# Patient Record
Sex: MALE | Race: WHITE | NOT HISPANIC OR LATINO | Employment: UNEMPLOYED | URBAN - METROPOLITAN AREA
[De-identification: names, ages, dates, MRNs, and addresses within clinical notes are randomized per-mention and may not be internally consistent; named-entity substitution may affect disease eponyms.]

---

## 2017-06-24 ENCOUNTER — APPOINTMENT (OUTPATIENT)
Dept: RADIOLOGY | Facility: MEDICAL CENTER | Age: 57
End: 2017-06-24
Attending: INTERNAL MEDICINE
Payer: MEDICARE

## 2017-06-24 ENCOUNTER — APPOINTMENT (OUTPATIENT)
Dept: RADIOLOGY | Facility: MEDICAL CENTER | Age: 57
End: 2017-06-24
Attending: EMERGENCY MEDICINE
Payer: MEDICARE

## 2017-06-24 ENCOUNTER — RESOLUTE PROFESSIONAL BILLING HOSPITAL PROF FEE (OUTPATIENT)
Dept: HOSPITALIST | Facility: MEDICAL CENTER | Age: 57
End: 2017-06-24
Payer: MEDICARE

## 2017-06-24 ENCOUNTER — HOSPITAL ENCOUNTER (OUTPATIENT)
Facility: MEDICAL CENTER | Age: 57
End: 2017-06-25
Attending: EMERGENCY MEDICINE | Admitting: INTERNAL MEDICINE
Payer: MEDICARE

## 2017-06-24 DIAGNOSIS — R10.13 EPIGASTRIC PAIN: ICD-10-CM

## 2017-06-24 DIAGNOSIS — R07.9 CHEST PAIN, UNSPECIFIED TYPE: ICD-10-CM

## 2017-06-24 PROBLEM — R10.9 ABDOMINAL PAIN: Status: ACTIVE | Noted: 2017-06-24

## 2017-06-24 LAB
ALBUMIN SERPL BCP-MCNC: 4.1 G/DL (ref 3.2–4.9)
ALBUMIN/GLOB SERPL: 1.7 G/DL
ALP SERPL-CCNC: 30 U/L (ref 30–99)
ALT SERPL-CCNC: 38 U/L (ref 2–50)
ANION GAP SERPL CALC-SCNC: 11 MMOL/L (ref 0–11.9)
ANISOCYTOSIS BLD QL SMEAR: ABNORMAL
APPEARANCE UR: CLEAR
APTT PPP: 33.9 SEC (ref 24.7–36)
AST SERPL-CCNC: 24 U/L (ref 12–45)
BACTERIA #/AREA URNS HPF: ABNORMAL /HPF
BASOPHILS # BLD AUTO: 0.9 % (ref 0–1.8)
BASOPHILS # BLD: 0.04 K/UL (ref 0–0.12)
BILIRUB SERPL-MCNC: 1 MG/DL (ref 0.1–1.5)
BILIRUB UR QL STRIP.AUTO: NEGATIVE
BNP SERPL-MCNC: 32 PG/ML (ref 0–100)
BUN SERPL-MCNC: 28 MG/DL (ref 8–22)
CALCIUM SERPL-MCNC: 8.6 MG/DL (ref 8.5–10.5)
CHLORIDE SERPL-SCNC: 104 MMOL/L (ref 96–112)
CO2 SERPL-SCNC: 21 MMOL/L (ref 20–33)
COLOR UR: YELLOW
CREAT SERPL-MCNC: 5.31 MG/DL (ref 0.5–1.4)
EOSINOPHIL # BLD AUTO: 0.25 K/UL (ref 0–0.51)
EOSINOPHIL NFR BLD: 5.3 % (ref 0–6.9)
ERYTHROCYTE [DISTWIDTH] IN BLOOD BY AUTOMATED COUNT: 66.4 FL (ref 35.9–50)
GFR SERPL CREATININE-BSD FRML MDRD: 11 ML/MIN/1.73 M 2
GLOBULIN SER CALC-MCNC: 2.4 G/DL (ref 1.9–3.5)
GLUCOSE SERPL-MCNC: 106 MG/DL (ref 65–99)
GLUCOSE UR STRIP.AUTO-MCNC: NEGATIVE MG/DL
HAV IGM SERPL QL IA: NEGATIVE
HBV CORE IGM SER QL: NEGATIVE
HBV SURFACE AB SERPL IA-ACNC: <3.1 MIU/ML (ref 0–10)
HBV SURFACE AG SER QL: NEGATIVE
HCT VFR BLD AUTO: 30.6 % (ref 42–52)
HCV AB SER QL: NEGATIVE
HGB BLD-MCNC: 10.6 G/DL (ref 14–18)
INR PPP: 0.98 (ref 0.87–1.13)
KETONES UR STRIP.AUTO-MCNC: NEGATIVE MG/DL
LACTATE BLD-SCNC: 0.8 MMOL/L (ref 0.5–2)
LACTATE BLD-SCNC: 0.8 MMOL/L (ref 0.5–2)
LEUKOCYTE ESTERASE UR QL STRIP.AUTO: NEGATIVE
LIPASE SERPL-CCNC: 20 U/L (ref 11–82)
LYMPHOCYTES # BLD AUTO: 0.67 K/UL (ref 1–4.8)
LYMPHOCYTES NFR BLD: 14 % (ref 22–41)
MACROCYTES BLD QL SMEAR: ABNORMAL
MANUAL DIFF BLD: NORMAL
MCH RBC QN AUTO: 38.5 PG (ref 27–33)
MCHC RBC AUTO-ENTMCNC: 34.6 G/DL (ref 33.7–35.3)
MCV RBC AUTO: 111.3 FL (ref 81.4–97.8)
MICRO URNS: ABNORMAL
MONOCYTES # BLD AUTO: 0.8 K/UL (ref 0–0.85)
MONOCYTES NFR BLD AUTO: 16.7 % (ref 0–13.4)
MORPHOLOGY BLD-IMP: NORMAL
NEUTROPHILS # BLD AUTO: 3.03 K/UL (ref 1.82–7.42)
NEUTROPHILS NFR BLD: 63.1 % (ref 44–72)
NITRITE UR QL STRIP.AUTO: NEGATIVE
NRBC # BLD AUTO: 0 K/UL
NRBC BLD AUTO-RTO: 0 /100 WBC
PH UR STRIP.AUTO: 8 [PH]
PLATELET # BLD AUTO: 55 K/UL (ref 164–446)
PLATELET BLD QL SMEAR: NORMAL
PMV BLD AUTO: 11.7 FL (ref 9–12.9)
POTASSIUM SERPL-SCNC: 4.1 MMOL/L (ref 3.6–5.5)
PROT SERPL-MCNC: 6.5 G/DL (ref 6–8.2)
PROT UR QL STRIP: 100 MG/DL
PROTHROMBIN TIME: 13.3 SEC (ref 12–14.6)
RBC # BLD AUTO: 2.75 M/UL (ref 4.7–6.1)
RBC # URNS HPF: ABNORMAL /HPF
RBC BLD AUTO: PRESENT
RBC UR QL AUTO: ABNORMAL
SODIUM SERPL-SCNC: 136 MMOL/L (ref 135–145)
SP GR UR STRIP.AUTO: 1.01
TROPONIN I SERPL-MCNC: 0.04 NG/ML (ref 0–0.04)
WBC # BLD AUTO: 4.8 K/UL (ref 4.8–10.8)
WBC #/AREA URNS HPF: ABNORMAL /HPF

## 2017-06-24 PROCEDURE — 84484 ASSAY OF TROPONIN QUANT: CPT

## 2017-06-24 PROCEDURE — 85027 COMPLETE CBC AUTOMATED: CPT

## 2017-06-24 PROCEDURE — A9270 NON-COVERED ITEM OR SERVICE: HCPCS | Performed by: INTERNAL MEDICINE

## 2017-06-24 PROCEDURE — 700102 HCHG RX REV CODE 250 W/ 637 OVERRIDE(OP): Performed by: NURSE PRACTITIONER

## 2017-06-24 PROCEDURE — 700111 HCHG RX REV CODE 636 W/ 250 OVERRIDE (IP): Performed by: INTERNAL MEDICINE

## 2017-06-24 PROCEDURE — A9270 NON-COVERED ITEM OR SERVICE: HCPCS | Performed by: PHARMACIST

## 2017-06-24 PROCEDURE — 96361 HYDRATE IV INFUSION ADD-ON: CPT

## 2017-06-24 PROCEDURE — 80053 COMPREHEN METABOLIC PANEL: CPT

## 2017-06-24 PROCEDURE — 90935 HEMODIALYSIS ONE EVALUATION: CPT

## 2017-06-24 PROCEDURE — G0378 HOSPITAL OBSERVATION PER HR: HCPCS

## 2017-06-24 PROCEDURE — 96376 TX/PRO/DX INJ SAME DRUG ADON: CPT | Mod: XU

## 2017-06-24 PROCEDURE — 81001 URINALYSIS AUTO W/SCOPE: CPT

## 2017-06-24 PROCEDURE — 83690 ASSAY OF LIPASE: CPT

## 2017-06-24 PROCEDURE — A9270 NON-COVERED ITEM OR SERVICE: HCPCS | Performed by: EMERGENCY MEDICINE

## 2017-06-24 PROCEDURE — 76705 ECHO EXAM OF ABDOMEN: CPT

## 2017-06-24 PROCEDURE — 96372 THER/PROPH/DIAG INJ SC/IM: CPT | Mod: XU

## 2017-06-24 PROCEDURE — 700105 HCHG RX REV CODE 258: Performed by: INTERNAL MEDICINE

## 2017-06-24 PROCEDURE — 85730 THROMBOPLASTIN TIME PARTIAL: CPT

## 2017-06-24 PROCEDURE — 83880 ASSAY OF NATRIURETIC PEPTIDE: CPT

## 2017-06-24 PROCEDURE — 93005 ELECTROCARDIOGRAM TRACING: CPT | Performed by: EMERGENCY MEDICINE

## 2017-06-24 PROCEDURE — 85610 PROTHROMBIN TIME: CPT

## 2017-06-24 PROCEDURE — 36415 COLL VENOUS BLD VENIPUNCTURE: CPT

## 2017-06-24 PROCEDURE — 99220 PR INITIAL OBSERVATION CARE,LEVL III: CPT | Performed by: INTERNAL MEDICINE

## 2017-06-24 PROCEDURE — 700102 HCHG RX REV CODE 250 W/ 637 OVERRIDE(OP): Performed by: EMERGENCY MEDICINE

## 2017-06-24 PROCEDURE — 87086 URINE CULTURE/COLONY COUNT: CPT

## 2017-06-24 PROCEDURE — 700111 HCHG RX REV CODE 636 W/ 250 OVERRIDE (IP): Performed by: HOSPITALIST

## 2017-06-24 PROCEDURE — 85007 BL SMEAR W/DIFF WBC COUNT: CPT

## 2017-06-24 PROCEDURE — 86706 HEP B SURFACE ANTIBODY: CPT

## 2017-06-24 PROCEDURE — A9270 NON-COVERED ITEM OR SERVICE: HCPCS | Performed by: NURSE PRACTITIONER

## 2017-06-24 PROCEDURE — 700102 HCHG RX REV CODE 250 W/ 637 OVERRIDE(OP): Performed by: PHARMACIST

## 2017-06-24 PROCEDURE — 96374 THER/PROPH/DIAG INJ IV PUSH: CPT | Mod: XU

## 2017-06-24 PROCEDURE — 87040 BLOOD CULTURE FOR BACTERIA: CPT

## 2017-06-24 PROCEDURE — 99285 EMERGENCY DEPT VISIT HI MDM: CPT

## 2017-06-24 PROCEDURE — 83605 ASSAY OF LACTIC ACID: CPT | Mod: 91

## 2017-06-24 PROCEDURE — 80074 ACUTE HEPATITIS PANEL: CPT

## 2017-06-24 PROCEDURE — 700102 HCHG RX REV CODE 250 W/ 637 OVERRIDE(OP): Performed by: INTERNAL MEDICINE

## 2017-06-24 PROCEDURE — 71010 DX-CHEST-PORTABLE (1 VIEW): CPT

## 2017-06-24 RX ORDER — ACYCLOVIR 200 MG/1
200 CAPSULE ORAL 2 TIMES DAILY
Status: DISCONTINUED | OUTPATIENT
Start: 2017-06-24 | End: 2017-06-25 | Stop reason: HOSPADM

## 2017-06-24 RX ORDER — OMEPRAZOLE 20 MG/1
20 CAPSULE, DELAYED RELEASE ORAL 2 TIMES DAILY
Status: DISCONTINUED | OUTPATIENT
Start: 2017-06-24 | End: 2017-06-25 | Stop reason: HOSPADM

## 2017-06-24 RX ORDER — PROMETHAZINE HYDROCHLORIDE 25 MG/1
12.5-25 TABLET ORAL EVERY 4 HOURS PRN
Status: DISCONTINUED | OUTPATIENT
Start: 2017-06-24 | End: 2017-06-25 | Stop reason: HOSPADM

## 2017-06-24 RX ORDER — ACETAMINOPHEN 325 MG/1
650 TABLET ORAL EVERY 6 HOURS PRN
Status: DISCONTINUED | OUTPATIENT
Start: 2017-06-24 | End: 2017-06-25 | Stop reason: HOSPADM

## 2017-06-24 RX ORDER — FOLIC ACID/VIT B COMPLEX AND C 0.8 MG
1 TABLET ORAL EVERY EVENING
COMMUNITY

## 2017-06-24 RX ORDER — PREDNISONE 2.5 MG/1
7.5 TABLET ORAL DAILY
COMMUNITY

## 2017-06-24 RX ORDER — LORAZEPAM 0.5 MG/1
0.25 TABLET ORAL EVERY 6 HOURS PRN
COMMUNITY

## 2017-06-24 RX ORDER — MORPHINE SULFATE 4 MG/ML
1-2 INJECTION, SOLUTION INTRAMUSCULAR; INTRAVENOUS EVERY 6 HOURS PRN
Status: DISCONTINUED | OUTPATIENT
Start: 2017-06-24 | End: 2017-06-25

## 2017-06-24 RX ORDER — AMOXICILLIN 250 MG
2 CAPSULE ORAL 2 TIMES DAILY
Status: DISCONTINUED | OUTPATIENT
Start: 2017-06-24 | End: 2017-06-25 | Stop reason: HOSPADM

## 2017-06-24 RX ORDER — DEXAMETHASONE 4 MG/1
40 TABLET ORAL SEE ADMIN INSTRUCTIONS
Status: DISCONTINUED | OUTPATIENT
Start: 2017-06-24 | End: 2017-06-24

## 2017-06-24 RX ORDER — ONDANSETRON 4 MG/1
4 TABLET, ORALLY DISINTEGRATING ORAL EVERY 4 HOURS PRN
Status: DISCONTINUED | OUTPATIENT
Start: 2017-06-24 | End: 2017-06-25 | Stop reason: HOSPADM

## 2017-06-24 RX ORDER — LENALIDOMIDE 5 MG/1
5 CAPSULE ORAL SEE ADMIN INSTRUCTIONS
COMMUNITY

## 2017-06-24 RX ORDER — ASPIRIN 325 MG
325 TABLET ORAL ONCE
Status: COMPLETED | OUTPATIENT
Start: 2017-06-24 | End: 2017-06-24

## 2017-06-24 RX ORDER — LENALIDOMIDE 5 MG/1
5 CAPSULE ORAL SEE ADMIN INSTRUCTIONS
Status: DISCONTINUED | OUTPATIENT
Start: 2017-06-24 | End: 2017-06-24

## 2017-06-24 RX ORDER — FENTANYL 25 UG/1
1 PATCH TRANSDERMAL
Status: DISCONTINUED | OUTPATIENT
Start: 2017-06-24 | End: 2017-06-25 | Stop reason: HOSPADM

## 2017-06-24 RX ORDER — HEPARIN SODIUM 5000 [USP'U]/ML
5000 INJECTION, SOLUTION INTRAVENOUS; SUBCUTANEOUS EVERY 8 HOURS
Status: DISCONTINUED | OUTPATIENT
Start: 2017-06-24 | End: 2017-06-25 | Stop reason: HOSPADM

## 2017-06-24 RX ORDER — LORAZEPAM 1 MG/1
0.25 TABLET ORAL EVERY 6 HOURS PRN
Status: DISCONTINUED | OUTPATIENT
Start: 2017-06-24 | End: 2017-06-25 | Stop reason: HOSPADM

## 2017-06-24 RX ORDER — POLYETHYLENE GLYCOL 3350 17 G/17G
1 POWDER, FOR SOLUTION ORAL
Status: DISCONTINUED | OUTPATIENT
Start: 2017-06-24 | End: 2017-06-25 | Stop reason: HOSPADM

## 2017-06-24 RX ORDER — FENTANYL 25 UG/1
1 PATCH TRANSDERMAL
COMMUNITY

## 2017-06-24 RX ORDER — SODIUM CHLORIDE 9 MG/ML
INJECTION, SOLUTION INTRAVENOUS CONTINUOUS
Status: DISCONTINUED | OUTPATIENT
Start: 2017-06-24 | End: 2017-06-25 | Stop reason: HOSPADM

## 2017-06-24 RX ORDER — PROMETHAZINE HYDROCHLORIDE 25 MG/1
12.5-25 SUPPOSITORY RECTAL EVERY 4 HOURS PRN
Status: DISCONTINUED | OUTPATIENT
Start: 2017-06-24 | End: 2017-06-25 | Stop reason: HOSPADM

## 2017-06-24 RX ORDER — BISACODYL 10 MG
10 SUPPOSITORY, RECTAL RECTAL
Status: DISCONTINUED | OUTPATIENT
Start: 2017-06-24 | End: 2017-06-25 | Stop reason: HOSPADM

## 2017-06-24 RX ORDER — ACYCLOVIR 200 MG/1
200 CAPSULE ORAL 2 TIMES DAILY
COMMUNITY

## 2017-06-24 RX ORDER — PANTOPRAZOLE SODIUM 40 MG/1
40 TABLET, DELAYED RELEASE ORAL 2 TIMES DAILY
COMMUNITY

## 2017-06-24 RX ORDER — ONDANSETRON 2 MG/ML
4 INJECTION INTRAMUSCULAR; INTRAVENOUS EVERY 4 HOURS PRN
Status: DISCONTINUED | OUTPATIENT
Start: 2017-06-24 | End: 2017-06-25 | Stop reason: HOSPADM

## 2017-06-24 RX ORDER — DEXAMETHASONE 4 MG/1
40 TABLET ORAL SEE ADMIN INSTRUCTIONS
COMMUNITY

## 2017-06-24 RX ORDER — PANTOPRAZOLE SODIUM 40 MG/1
40 TABLET, DELAYED RELEASE ORAL 2 TIMES DAILY
Status: DISCONTINUED | OUTPATIENT
Start: 2017-06-24 | End: 2017-06-24

## 2017-06-24 RX ADMIN — LORAZEPAM 0.25 MG: 1 TABLET ORAL at 00:05

## 2017-06-24 RX ADMIN — ASPIRIN 81 MG: 81 TABLET ORAL at 15:38

## 2017-06-24 RX ADMIN — LORAZEPAM 0.25 MG: 1 TABLET ORAL at 15:50

## 2017-06-24 RX ADMIN — HEPARIN SODIUM 5000 UNITS: 5000 INJECTION, SOLUTION INTRAVENOUS; SUBCUTANEOUS at 15:38

## 2017-06-24 RX ADMIN — OMEPRAZOLE 20 MG: 20 CAPSULE, DELAYED RELEASE ORAL at 15:38

## 2017-06-24 RX ADMIN — ASPIRIN 325 MG: 325 TABLET, COATED ORAL at 10:06

## 2017-06-24 RX ADMIN — SODIUM CHLORIDE: 9 INJECTION, SOLUTION INTRAVENOUS at 15:41

## 2017-06-24 RX ADMIN — CALCIUM CARBONATE-CHOLECALCIFEROL TAB 250 MG-125 UNIT 2 TABLET: 250-125 TAB at 22:50

## 2017-06-24 RX ADMIN — HEPARIN SODIUM 5000 UNITS: 5000 INJECTION, SOLUTION INTRAVENOUS; SUBCUTANEOUS at 22:10

## 2017-06-24 RX ADMIN — MORPHINE SULFATE 2 MG: 4 INJECTION INTRAVENOUS at 16:06

## 2017-06-24 RX ADMIN — MORPHINE SULFATE 2 MG: 4 INJECTION INTRAVENOUS at 22:10

## 2017-06-24 RX ADMIN — OMEPRAZOLE 20 MG: 20 CAPSULE, DELAYED RELEASE ORAL at 22:09

## 2017-06-24 RX ADMIN — ACETAMINOPHEN 650 MG: 325 TABLET, FILM COATED ORAL at 22:49

## 2017-06-24 RX ADMIN — ACYCLOVIR 200 MG: 200 CAPSULE ORAL at 22:09

## 2017-06-24 ASSESSMENT — PAIN SCALES - GENERAL
PAINLEVEL_OUTOF10: 5
PAINLEVEL_OUTOF10: 5

## 2017-06-24 ASSESSMENT — LIFESTYLE VARIABLES
EVER_SMOKED: NEVER
ALCOHOL_USE: NO
DO YOU DRINK ALCOHOL: NO

## 2017-06-24 NOTE — DISCHARGE PLANNING
Care Transition Team Assessment    Information Source  Orientation : Oriented x 4  Information Given By: Patient  Who is responsible for making decisions for patient? : Patient         Elopement Risk  Legal Hold: No  Ambulatory or Self Mobile in Wheelchair: No-Not an Elopement Risk    Interdisciplinary Discharge Planning  Able to Return to Previous ADL's: Yes  Mobility Issues: No  Durable Medical Equipment: Not Applicable              Finances  Financial Barriers to Discharge: No  Prescription Coverage: Yes                                  Anticipated Discharge Information  Anticipated discharge disposition: Home

## 2017-06-24 NOTE — ED NOTES
Protocol initiated, port accessed aseptic technique, sent blood to lab.  Called  for 2nd blood culture

## 2017-06-24 NOTE — H&P
PRIMARY CARE PHYSICIAN:  None stated.    CHIEF COMPLAINT:  Abdominal pain.    HISTORY OF PRESENT ILLNESS:  Patient is a 56-year-old male from [place]   here in town for thang came to the ER with above.  Per patient, he has been   having epigastric pain and also right upper quadrant pain for the past 2 days.   He described the pain as bloating pain 5-6/10 in intensity, no radiation.    Because of the pain, he has not been able to eat or drink much.  Patient also   has history of end-stage renal disease on hemodialysis Tuesday, Thursday, and   Saturday.  Nephrology has been consulted.  Also, the patient has history of   multiple myeloma.  Apart from that, patient denied any fever, chills, nausea,   vomiting or any neurological deficit.    REVIEW OF SYSTEMS:  All other systems were reviewed and negative.  The rest by   HPI.    ALLERGY HISTORY:  No known drug allergy.    PAST MEDICAL HISTORY:  1.  End-stage renal disease, on hemodialysis.  2.  Multiple myeloma.    PAST SURGICAL HISTORY:  AV fistula creation.    SOCIAL HISTORY:  Denies smoking, drinks alcohol 1 or 2 shots of alcohol a day.    Denied illicit drug abuse.    FAMILY HISTORY:  No pertinent family history.    OUTPATIENT MEDICATIONS:  Ativan 0.25 mg q. 8 hours p.r.n., aspirin 81 mg   daily, pantoprazole 40 mg b.i.d., acyclovir 200 mg b.i.d., prednisone 7.5 mg   daily, lenalidomide 5 mg, dexamethasone 40 mg with chemotherapy, fentanyl   patch.    PHYSICAL EXAMINATION:  VITAL SIGNS:  Temperature 98.4, pulse rate 68, respiratory rate 15, blood   pressure 118/75, satting 93% on room air.  GENERAL:  Alert, communicative.  HEENT:  Normocephalic, atraumatic.  NECK:  Supple.  No neck gland enlargement.  CARDIOVASCULAR:  Normal first and second sound.  No murmur.  RESPIRATORY:  Normal respiratory effort.  No wheezing.  ABDOMEN:  Soft, bowel sounds present.  Tenderness on palpation in the right   upper quadrant and epigastric area noticed.  No guarding, no  rebound.  CENTRAL NERVOUS SYSTEM:  Cranial nerve II through XII intact.  No neurological   deficit.  EXTREMITIES:  No edema, clubbing.  PSYCHIATRIC:  Normal affect and mood.  Alert and oriented x4.  SKIN:  Warm and moist.    LABORATORY DATA:  WBC 4.8, hemoglobin 10.6, platelets 55.  Sodium 137,   potassium 4.1, BUN 28, creatinine 5.31.    ASSESSMENT AND PLAN:  1.  Upper quadrant pain and epigastric pain pathology: ? from   cholecystitis.  Patient's lipase is normal.  I ordered ultrasound of abdomen   to rule out any gallbladder pathology.  There is no liver enzyme or bilirubin   elevation on chem. panel.  Also, another possibility is underlying    duodenal ulcer since the patient is on steroids for his multiple myeloma.  Continue pantoprazole.  2.  History of multiple myeloma.  Patient is taking steroids including   dexamethasone with chemotherapy and prednisone and lenalidomide.  3.  End-stage renal disease, on hemodialysis.  Nephrology has been consulted.  4.  Deep venous thrombosis prophylaxis, heparin.  5.  Patient is a FULL CODE.       ____________________________________     MD YURI REYNOLDS / DERICK    DD:  06/24/2017 12:29:04  DT:  06/24/2017 16:28:55    D#:  [id number]  Job#:  [id number]

## 2017-06-24 NOTE — ED NOTES
The Medication Reconciliation process has been completed by interviewing the patient who has his meds with him.      Allergies have been reviewed  Antibiotic use in 30 days - none    Home Pharmacy:  [facility] in [place], [place] and "[facility]" ([place]) for his Revlimid

## 2017-06-24 NOTE — PROGRESS NOTES
Received pt from ED on arrival pt a&ox4,protective isolation cont'd,pt with AV fistula on Lt arm with good bruit and thrill,right chest port accessed,with ivf running,pt still c/o epigastric pain,pt for dialysis,poc explained.

## 2017-06-24 NOTE — ED NOTES
Pt ambulated to green 23,changed to gown and placed monitor.   Pt from [place] here for the [place], he is also a dialysis patient. He is due to be dialize today.   Ordered protective isolation.

## 2017-06-24 NOTE — IP AVS SNAPSHOT
" Home Care Instructions                                                                                                                  Name:Alfredo Thomas  Medical Record Number:[id number]  CSN: [id number]    YOB: 1960   Age: 56 y.o.  Sex: male  HT:1.727 m (5' 8\") WT: 76.4 kg (168 lb 6.9 oz)          Admit Date: 6/24/2017     Discharge Date:   Today's Date: 6/25/2017  Attending Doctor:  Jony Burks M.D.                  Allergies:  Review of patient's allergies indicates no known allergies.            Discharge Instructions       Discharge Instructions    Discharged to home by car with friend. Discharged via wheelchair, hospital escort: Yes.  Special equipment needed: Not Applicable    Be sure to schedule a follow-up appointment with your primary care doctor or any specialists as instructed.     Discharge Plan:   Diet Plan: Discussed  Activity Level: Discussed  Confirmed Follow up Appointment: Patient to Call and Schedule Appointment  Confirmed Symptoms Management: Discussed  Medication Reconciliation Updated: Yes  Influenza Vaccine Indication: Not indicated: Previously immunized this influenza season and > 8 years of age    I understand that a diet low in cholesterol, fat, and sodium is recommended for good health. Unless I have been given specific instructions below for another diet, I accept this instruction as my diet prescription.   Other diet:     Special Instructions:   RX eprescribed to [id number] - [facility] [place] - [address] AT [facility]   Activity: As tolerated.   Diet: renal   Followup:   -PCP 1 week   -Oncology, call Monday for appointment to follow up on CT scan results   -GI referral by PCP or oncology for colonoscopy   -Your nephrologist Tuesday, for HD   Instructions:   -Given instructions to return to the ER if any new or worsening symptoms, worsening condition, or failure to improve   -Call PCP for followup   -No smoking, no alcohol, no caffeine   "   -Encourage risk factor reduction with tobacco and alcohol abstinence, diet changes,     · Is patient discharged on Warfarin / Coumadin?   No     · Is patient Post Blood Transfusion?  No    Clostridium Difficile Infection  Clostridium difficile (C. difficile or C. diff) is a bacterium normally found in the intestinal tract or colon. C. difficile infection causes diarrhea and sometimes a severe disease called pseudomembranous colitis (C. difficile colitis). C. difficile colitis can damage the lining of the colon or cause the colon to become very large (toxic megacolon). Older adults and people with certain medical conditions have a greater risk of getting C. difficile infections.  CAUSES  The balance of bacteria in your colon can change when you are sick, especially when taking antibiotic medicine. Taking antibiotics may allow the C. difficile to grow, multiply, and make a toxin that causes C. difficile infection.   SYMPTOMS  · Diarrhea.  · Fever.  · Fatigue.  · Loss of appetite.  · Nausea.  · Abdominal swelling, pain, or tenderness.  · Dehydration.  DIAGNOSIS  Your health care provider may suspect C. difficile infection based on your symptoms and if you have taken antibiotics recently. Your health care provider may also order:  · A lab test that can detect the toxin in your stool.  · A sigmoidoscopy or colonoscopy to look at the appearance of your colon. These procedures involve passing an instrument through your rectum to look at the inside of your colon.  Your health care provider will help determine if these tests are necessary.  TREATMENT  Treatment may include:  · Taking antibiotics that keep C. difficile from growing.  · Stopping the antibiotics you were on before the C. difficile infection began. Only do this if instructed to do so by your health care provider.  · IV fluids and correction of electrolyte imbalance.  · Surgery to remove the infected part of the intestines. This is rare.  HOME CARE  INSTRUCTIONS  · Drink enough fluids to keep your urine clear or pale yellow. Avoid milk, caffeine, and alcohol.  · Ask your health care provider for specific rehydration instructions.  · Eat small, frequent meals rather than large meals.  · Take your antibiotics as directed. Finish them even if you start to feel better.  · Do not use medicines to slow diarrhea. This could delay healing or cause problems.  · Wash your hands thoroughly after using the bathroom and before preparing food. Make sure people who live with you wash their hands often, too.  · Clean all surfaces with a product that contains chlorine bleach.  SEEK MEDICAL CARE IF:  · Your diarrhea lasts longer than expected or comes back after you finish your antibiotic medicine for the C. difficile infection.  · You have trouble staying hydrated.  · You have a fever.  SEEK IMMEDIATE MEDICAL CARE IF:  · You have increasing abdominal pain or tenderness.  · You have blood in your stools, or your stools look dark black and tarry.  · You cannot eat or drink without vomiting.     This information is not intended to replace advice given to you by your health care provider. Make sure you discuss any questions you have with your health care provider.     Document Released: 09/27/2006 Document Revised: 01/08/2016 Document Reviewed: 05/25/2012  [employer] Interactive Patient Education ©2016 [employer]      Follow-up Information     1. Follow up with Your PCP. Call in 1 day.    Why:  For appointment for followup        2. Follow up with Your oncologist. Call in 1 day.    Why:  For followup appointment; to discuss colon mass        3. Schedule an appointment as soon as possible for a visit with GI referral.    Why:  Discuss with PCP for referral for colonoscopy and biopsy        4. Follow up with Your nephrologist. Schedule an appointment as soon as possible for a visit in 1 day.    Why:  For HD         Discharge Medication Instructions:    Below are the medications your  physician expects you to take upon discharge:    Review all your home medications and newly ordered medications with your doctor and/or pharmacist. Follow medication instructions as directed by your doctor and/or pharmacist.    Please keep your medication list with you and share with your physician.               Medication List      START taking these medications        Instructions    Morning Afternoon Evening Bedtime    acetaminophen 325 MG Tabs   Last time this was given:  650 mg on 6/24/2017 10:49 PM   Commonly known as:  TYLENOL        Take 2 Tabs by mouth every 6 hours as needed (Mild Pain; (Pain scale 1-3); Temp greater than 100.5 F).   Dose:  650 mg                        tramadol 50 MG Tabs   Commonly known as:  ULTRAM        Take 1 Tab by mouth every 6 hours as needed for Moderate Pain.   Dose:  50 mg                        vancomycin 50 mg/mL 50 mg/mL Soln   Last time this was given:  125 mg on 6/25/2017 11:41 AM        Take 2.5 mL by mouth every 6 hours for 10 days.   Dose:  125 mg                          CONTINUE taking these medications        Instructions    Morning Afternoon Evening Bedtime    acyclovir 200 MG Caps   Last time this was given:  200 mg on 6/25/2017 10:01 AM   Commonly known as:  ZOVIRAX        Take 200 mg by mouth 2 times a day.   Dose:  200 mg                        aspirin EC 81 MG Tbec   Last time this was given:  81 mg on 6/25/2017 10:01 AM   Commonly known as:  ECOTRIN        Take 81 mg by mouth every day.   Dose:  81 mg                        B-Complex w/ C & Folic Acid Tabs        Take 1 Tab by mouth every evening.   Dose:  1 Tab                        Calcium-Vitamin D3 600-500 MG-UNIT Caps        Take 1 Cap by mouth 2 Times a Day.   Dose:  1 Cap                        dexamethasone 4 MG Tabs   Commonly known as:  DECADRON        Take 40 mg by mouth See Admin Instructions. Takes on days 1, 8, 15 and 22 with Revlimid cycle   Dose:  40 mg                        fentanyl 25  MCG/HR Pt72   Commonly known as:  DURAGESIC        Apply 1 Patch to skin as directed every 72 hours.   Dose:  1 Patch                        lorazepam 0.5 MG Tabs   Last time this was given:  0.25 mg on 6/24/2017  3:50 PM   Commonly known as:  ATIVAN        Take 0.25 mg by mouth every 6 hours as needed for Anxiety (nausea).   Dose:  0.25 mg                        pantoprazole 40 MG Tbec   Commonly known as:  PROTONIX        Take 40 mg by mouth 2 times a day.   Dose:  40 mg                        prednisONE 2.5 MG Tabs   Last time this was given:  7.5 mg on 6/25/2017 10:02 AM   Commonly known as:  DELTASONE        Take 7.5 mg by mouth every day.   Dose:  7.5 mg                        REVLIMID 5 MG Caps   Generic drug:  Lenalidomide        Take 5 mg by mouth See Admin Instructions. Takes for 21 days every 28 days.   Dose:  5 mg                             Where to Get Your Medications      These medications were sent to [facility] [address] [place] [address] [address] AT [facility]  [address] [facility] [address]    Hours:  24-hours Phone:  [phone]    - vancomycin 50 mg/mL 50 mg/mL Soln      Information about where to get these medications is not yet available     ! Ask your nurse or doctor about these medications    - tramadol 50 MG Tabs            Instructions           Diet / Nutrition:    Follow any diet instructions given to you by your doctor or the dietician, including how much salt (sodium) you are allowed each day.    If you are overweight, talk to your doctor about a weight reduction plan.    Activity:    Remain physically active following your doctor's instructions about exercise and activity.    Rest often.     Any time you become even a little tired or short of breath, SIT DOWN and rest.    Worsening Symptoms:    Report any of the following signs and symptoms to the doctor's office immediately:    *Pain of jaw, arm, or neck  *Chest pain not relieved by medication                                *Dizziness or loss of consciousness  *Difficulty breathing even when at rest   *More tired than usual                                       *Bleeding drainage or swelling of surgical site  *Swelling of feet, ankles, legs or stomach                 *Fever (>100ºF)  *Pink or blood tinged sputum  *Weight gain (3lbs/day or 5lbs /week)           *Shock from internal defibrillator (if applicable)  *Palpitations or irregular heartbeats                *Cool and/or numb extremities    Stroke Awareness    Common Risk Factors for Stroke include:    Age  Atrial Fibrillation  Carotid Artery Stenosis  Diabetes Mellitus  Excessive alcohol consumption  High blood pressure  Overweight   Physical inactivity  Smoking    Warning signs and symptoms of a stroke include:    *Sudden numbness or weakness of the face, arm or leg (especially on one side of the body).  *Sudden confusion, trouble speaking or understanding.  *Sudden trouble seeing in one or both eyes.  *Sudden trouble walking, dizziness, loss of balance or coordination.Sudden severe headache with no known cause.    It is very important to get treatment quickly when a stroke occurs. If you experience any of the above warning signs, call 911 immediately.                   Disclaimer         Quit Smoking / Tobacco Use:    I understand the use of any tobacco products increases my chance of suffering from future heart disease or stroke and could cause other illnesses which may shorten my life. Quitting the use of tobacco products is the single most important thing I can do to improve my health. For further information on smoking / tobacco cessation call a Toll Free Quit Line at 1-218.886.6134 (*[employer]) or 1-183.488.3941 (American [employer] Association) or you can access the web based program at www.lungusa.org.    [place] Tobacco Users Help Line:  (849) 884-7401       Toll Free: 1-829.209.1232  Quit Tobacco Program [facility]  (747) 460-2750    Crisis Hotline:    [place] Crisis Hotline:  7-635-PMPPBWY or 1-759.482.6698    [place] Crisis Hotline:    1-696.856.6743 or 584-034-6655    Discharge Survey:   [facility] you for [facility]. We hope we did everything we could to make your hospital stay a pleasant one. You may be receiving a phone survey and we would appreciate your time and participation in answering the questions. Your input is very valuable to us in our efforts to improve our service to our patients and their families.        My signature on this form indicates that:    1. I have reviewed and understand the above information.  2. My questions regarding this information have been answered to my satisfaction.  3. I have formulated a plan with my discharge nurse to obtain my prescribed medications for home.                  Disclaimer         __________________________________                     __________       ________                       Patient Signature                                                 Date                    Time

## 2017-06-24 NOTE — ED PROVIDER NOTES
"ED Provider Note    CHIEF COMPLAINT  Chief Complaint   Patient presents with   • Diarrhea     onset yesterday morning   • Multiple Myeloma     x 3 years       HPI  Alfredo Thomas is a 56 y.o. male who presents with mid chest pain and epigastric pain, the last 24 hours, pain severe dull gradual in onset without radiation or shortness breath does have nausea no vomiting, diarrhea this morning. Pain does not radiate, no diaphoresis. Does have a history of multiple myeloma, last chemotherapy intravenous was enlarged, last by mouth chemotherapy 6 days ago. It also history of renal failure last dialysis 2 days ago next dialysis is scheduled this morning.    REVIEW OF SYSTEMS  See HPI for further details. History multiple myeloma, renal failureDenies other G.I., G.U.. endrocine, cardiovascular, respriatory or neurological problems.  All other systems are negative.     PAST MEDICAL HISTORY  No past medical history on file.    FAMILY HISTORY  No family history on file.    SOCIAL HISTORY  Social History     Social History   • Marital Status:      Spouse Name: N/A   • Number of Children: N/A   • Years of Education: N/A     Social History Main Topics   • Smoking status: Not on file   • Smokeless tobacco: Not on file   • Alcohol Use: Not on file   • Drug Use: Not on file   • Sexual Activity: Not on file     Other Topics Concern   • Not on file     Social History Narrative   • No narrative on file       SURGICAL HISTORY  No past surgical history on file.    CURRENT MEDICATIONS  Home Medications     **Home medications have not yet been reviewed for this encounter**          ALLERGIES  No Known Allergies    PHYSICAL EXAM  VITAL SIGNS: /71 mmHg  Pulse 64  Temp(Src) 36.9 °C (98.4 °F)  Resp 16  Ht 1.727 m (5' 8\")  Wt 76.4 kg (168 lb 6.9 oz)  BMI 25.62 kg/m2  SpO2 97%  Constitutional: Well developed, Well nourished, No acute distress, Non-toxic appearance.   HENT: Normocephalic, Atraumatic, Bilateral external " ears normal, Oropharynx moist, No oral exudates, Nose normal.   Eyes: PERRL, EOMI, Conjunctiva normal, No discharge.   Neck: Normal range of motion, No tenderness, Supple, No stridor.   Lymphatic: No lymphadenopathy noted.   Cardiovascular: Normal heart rate, Normal rhythm, No murmurs, No rubs, No gallops.   Thorax & Lungs: Normal breath sounds, No respiratory distress, No wheezing, No chest tenderness. Catheter, right chest   Abdomen:  No tenderness, no guarding no rigidity and the abdomen is soft.  No masses, No pulsatile masses.  Skin: Warm, Dry, No erythema, No rash.   Back: No tenderness, No CVA tenderness.   Extremities: Intact distal pulses, No edema, No tenderness, No cyanosis, No clubbing. Shunt left arm, dialysis  Musculoskeletal: Good range of motion in all major joints. No tenderness to palpation or major deformities noted.   Neurologic: Alert & oriented x 3, Normal motor function, Normal sensory function, No focal deficits noted.   Psychiatric: Affect normal, Judgment normal, Mood normal.       RADIOLOGY/PROCEDURES      COURSE & MEDICAL DECISION MAKING  Pertinent Labs & Imaging studies reviewed. (See chart for details)      FINAL IMPRESSION  1.   1. Chest pain, unspecified type    2. Epigastric pain        2.   3.     Disposition  Patient was admitted for further workup.  Electronically signed by: Shiv Dean, 6/24/2017 9:49 AM

## 2017-06-24 NOTE — ED NOTES
"Chief Complaint   Patient presents with   • Diarrhea     onset yesterday morning   • Multiple Myeloma     x 3 years     Pt ambulatory to triage by self for above, Pt is currently receiving oral treatment for multiple myeloma, to start chemotherapy again soon, has hx of stem-cell transplant. Pt states he has had low WBC in past, neutropenic screening score of 2, charge RN notified. Pt denies any hematochezia or melena, states \"it just hasn't got better since it started and keeps getting worse\". Pt reporting lethargy and myalgias as well, states \"I'm just feeling worse and worse\". Pt reports mild epigastric pain. Pt given mask to wear, placed back in lobby at this time, told to alert staff to any sudden changes or worsening in condition.     /71 mmHg  Pulse 80  Temp(Src) 36.9 °C (98.4 °F)  Resp 16  Ht 1.727 m (5' 8\")  Wt 76.4 kg (168 lb 6.9 oz)  BMI 25.62 kg/m2  SpO2 100%    "

## 2017-06-25 ENCOUNTER — APPOINTMENT (OUTPATIENT)
Dept: RADIOLOGY | Facility: MEDICAL CENTER | Age: 57
End: 2017-06-25
Attending: NURSE PRACTITIONER
Payer: MEDICARE

## 2017-06-25 VITALS
DIASTOLIC BLOOD PRESSURE: 61 MMHG | TEMPERATURE: 98.5 F | WEIGHT: 168.43 LBS | OXYGEN SATURATION: 97 % | RESPIRATION RATE: 18 BRPM | HEART RATE: 69 BPM | SYSTOLIC BLOOD PRESSURE: 122 MMHG | BODY MASS INDEX: 25.53 KG/M2 | HEIGHT: 68 IN

## 2017-06-25 PROBLEM — N19 UREMIA: Status: ACTIVE | Noted: 2017-06-25

## 2017-06-25 PROBLEM — R10.9 ABDOMINAL PAIN: Status: RESOLVED | Noted: 2017-06-24 | Resolved: 2017-06-25

## 2017-06-25 PROBLEM — D61.818 PANCYTOPENIA (HCC): Status: ACTIVE | Noted: 2017-06-25

## 2017-06-25 PROBLEM — D53.9 MACROCYTIC ANEMIA: Status: ACTIVE | Noted: 2017-06-25

## 2017-06-25 PROBLEM — N19 UREMIA: Status: RESOLVED | Noted: 2017-06-25 | Resolved: 2017-06-25

## 2017-06-25 LAB
ALBUMIN SERPL BCP-MCNC: 3.4 G/DL (ref 3.2–4.9)
ALBUMIN/GLOB SERPL: 1.7 G/DL
ALP SERPL-CCNC: 27 U/L (ref 30–99)
ALT SERPL-CCNC: 23 U/L (ref 2–50)
ANION GAP SERPL CALC-SCNC: 7 MMOL/L (ref 0–11.9)
AST SERPL-CCNC: 17 U/L (ref 12–45)
BILIRUB SERPL-MCNC: 0.9 MG/DL (ref 0.1–1.5)
BUN SERPL-MCNC: 16 MG/DL (ref 8–22)
C DIFF DNA SPEC QL NAA+PROBE: NEGATIVE
C DIFF TOX A+B STL QL IA: POSITIVE
C DIFF TOX GENS STL QL NAA+PROBE: ABNORMAL
CALCIUM SERPL-MCNC: 7.9 MG/DL (ref 8.5–10.5)
CHLORIDE SERPL-SCNC: 107 MMOL/L (ref 96–112)
CO2 SERPL-SCNC: 25 MMOL/L (ref 20–33)
CREAT SERPL-MCNC: 3.55 MG/DL (ref 0.5–1.4)
ERYTHROCYTE [DISTWIDTH] IN BLOOD BY AUTOMATED COUNT: 65.2 FL (ref 35.9–50)
GFR SERPL CREATININE-BSD FRML MDRD: 18 ML/MIN/1.73 M 2
GLOBULIN SER CALC-MCNC: 2 G/DL (ref 1.9–3.5)
GLUCOSE SERPL-MCNC: 91 MG/DL (ref 65–99)
HCT VFR BLD AUTO: 28.3 % (ref 42–52)
HGB BLD-MCNC: 9.7 G/DL (ref 14–18)
MCH RBC QN AUTO: 38.5 PG (ref 27–33)
MCHC RBC AUTO-ENTMCNC: 34.3 G/DL (ref 33.7–35.3)
MCV RBC AUTO: 112.3 FL (ref 81.4–97.8)
PLATELET # BLD AUTO: 45 K/UL (ref 164–446)
PMV BLD AUTO: 11.7 FL (ref 9–12.9)
POTASSIUM SERPL-SCNC: 4.4 MMOL/L (ref 3.6–5.5)
PROT SERPL-MCNC: 5.4 G/DL (ref 6–8.2)
RBC # BLD AUTO: 2.52 M/UL (ref 4.7–6.1)
SODIUM SERPL-SCNC: 139 MMOL/L (ref 135–145)
WBC # BLD AUTO: 4.7 K/UL (ref 4.8–10.8)

## 2017-06-25 PROCEDURE — 700117 HCHG RX CONTRAST REV CODE 255: Performed by: HOSPITALIST

## 2017-06-25 PROCEDURE — 700111 HCHG RX REV CODE 636 W/ 250 OVERRIDE (IP): Performed by: INTERNAL MEDICINE

## 2017-06-25 PROCEDURE — 80053 COMPREHEN METABOLIC PANEL: CPT

## 2017-06-25 PROCEDURE — A9270 NON-COVERED ITEM OR SERVICE: HCPCS | Performed by: NURSE PRACTITIONER

## 2017-06-25 PROCEDURE — 74177 CT ABD & PELVIS W/CONTRAST: CPT

## 2017-06-25 PROCEDURE — 96361 HYDRATE IV INFUSION ADD-ON: CPT

## 2017-06-25 PROCEDURE — A9270 NON-COVERED ITEM OR SERVICE: HCPCS | Performed by: PHARMACIST

## 2017-06-25 PROCEDURE — 36415 COLL VENOUS BLD VENIPUNCTURE: CPT

## 2017-06-25 PROCEDURE — A9270 NON-COVERED ITEM OR SERVICE: HCPCS | Performed by: INTERNAL MEDICINE

## 2017-06-25 PROCEDURE — 96376 TX/PRO/DX INJ SAME DRUG ADON: CPT

## 2017-06-25 PROCEDURE — 700102 HCHG RX REV CODE 250 W/ 637 OVERRIDE(OP): Performed by: NURSE PRACTITIONER

## 2017-06-25 PROCEDURE — 87493 C DIFF AMPLIFIED PROBE: CPT

## 2017-06-25 PROCEDURE — 99217 PR OBSERVATION CARE DISCHARGE: CPT | Performed by: HOSPITALIST

## 2017-06-25 PROCEDURE — 700102 HCHG RX REV CODE 250 W/ 637 OVERRIDE(OP): Performed by: PHARMACIST

## 2017-06-25 PROCEDURE — G0378 HOSPITAL OBSERVATION PER HR: HCPCS

## 2017-06-25 PROCEDURE — 87324 CLOSTRIDIUM AG IA: CPT

## 2017-06-25 PROCEDURE — 96372 THER/PROPH/DIAG INJ SC/IM: CPT

## 2017-06-25 PROCEDURE — 85027 COMPLETE CBC AUTOMATED: CPT

## 2017-06-25 PROCEDURE — 700102 HCHG RX REV CODE 250 W/ 637 OVERRIDE(OP): Performed by: INTERNAL MEDICINE

## 2017-06-25 PROCEDURE — 700105 HCHG RX REV CODE 258: Performed by: INTERNAL MEDICINE

## 2017-06-25 PROCEDURE — 700111 HCHG RX REV CODE 636 W/ 250 OVERRIDE (IP): Performed by: HOSPITALIST

## 2017-06-25 RX ORDER — ACETAMINOPHEN 325 MG/1
650 TABLET ORAL EVERY 6 HOURS PRN
Qty: 30 TAB | Refills: 0 | COMMUNITY
Start: 2017-06-25

## 2017-06-25 RX ORDER — TRAMADOL HYDROCHLORIDE 50 MG/1
50 TABLET ORAL EVERY 6 HOURS PRN
Qty: 30 TAB | Refills: 0 | Status: SHIPPED | OUTPATIENT
Start: 2017-06-25

## 2017-06-25 RX ORDER — TRAMADOL HYDROCHLORIDE 50 MG/1
50 TABLET ORAL EVERY 6 HOURS PRN
Status: DISCONTINUED | OUTPATIENT
Start: 2017-06-25 | End: 2017-06-25 | Stop reason: HOSPADM

## 2017-06-25 RX ADMIN — SODIUM CHLORIDE: 9 INJECTION, SOLUTION INTRAVENOUS at 04:53

## 2017-06-25 RX ADMIN — VANCOMYCIN HYDROCHLORIDE 125 MG: 10 INJECTION, POWDER, LYOPHILIZED, FOR SOLUTION INTRAVENOUS at 11:41

## 2017-06-25 RX ADMIN — IOHEXOL 100 ML: 350 INJECTION, SOLUTION INTRAVENOUS at 13:49

## 2017-06-25 RX ADMIN — OMEPRAZOLE 20 MG: 20 CAPSULE, DELAYED RELEASE ORAL at 10:01

## 2017-06-25 RX ADMIN — HEPARIN SODIUM 5000 UNITS: 5000 INJECTION, SOLUTION INTRAVENOUS; SUBCUTANEOUS at 05:59

## 2017-06-25 RX ADMIN — ACYCLOVIR 200 MG: 200 CAPSULE ORAL at 10:01

## 2017-06-25 RX ADMIN — PREDNISONE 7.5 MG: 5 TABLET ORAL at 10:02

## 2017-06-25 RX ADMIN — ASPIRIN 81 MG: 81 TABLET ORAL at 10:01

## 2017-06-25 RX ADMIN — CALCIUM CARBONATE-CHOLECALCIFEROL TAB 250 MG-125 UNIT 2 TABLET: 250-125 TAB at 10:02

## 2017-06-25 RX ADMIN — MORPHINE SULFATE 2 MG: 4 INJECTION INTRAVENOUS at 04:52

## 2017-06-25 ASSESSMENT — PAIN SCALES - GENERAL
PAINLEVEL_OUTOF10: 8
PAINLEVEL_OUTOF10: 8

## 2017-06-25 ASSESSMENT — ENCOUNTER SYMPTOMS
NAUSEA: 0
CHILLS: 0
FEVER: 0
ABDOMINAL PAIN: 1
VOMITING: 0
DIARRHEA: 1

## 2017-06-25 ASSESSMENT — LIFESTYLE VARIABLES: DO YOU DRINK ALCOHOL: NO

## 2017-06-25 NOTE — CONSULTS
DATE OF SERVICE:  06/24/2017    REQUESTING PHYSICIAN:  Dr. Dean.      REASON FOR CONSULTATION:  Management of chronic kidney disease, assessing the   need for urgent dialysis.      HISTORY OF PRESENT ILLNESS:  The patient is a very pleasant 56-year-old   gentleman who lives in [place].  He has a history of end-stage renal   disease secondary to multiple myeloma.  He undergoes hemodialysis on Tuesday,   Thursday and Saturday.  The patient is in [place] for the [place], he presented to   the hospital with abdominal pain and diarrhea.  We were called to manage his   kidney disease, assessing the need for urgent dialysis.      The patient is currently taking chemotherapy for his myeloma, his last   dialysis was on Thursday.      The patient has no hematuria, no dysuria.      PAST MEDICAL HISTORY:  Significant for:    1.  Multiple myeloma.    2.  End-stage renal disease.    ALLERGIES:  No known drug allergies.      SOCIAL HISTORY:  No smoking.  Patient lives in [place].      FAMILY HISTORY:  No known renal disease.      REVIEW OF SYSTEMS:  Patient complained of diarrhea and abdominal pain, but no   fever, no chills, no hematuria, no dysuria.  All other review of systems for   total of 10 were reviewed and they were negative.      MEDICATIONS:  Reviewed.      PHYSICAL EXAMINATION:    GENERAL:  Patient is in no apparent distress.    VITAL SIGNS:  Show blood pressure of 130/71, heart rate was 82.    HEENT:  Normocephalic, atraumatic.  Sclerae is anicteric.    NECK:  No lymphadenopathy.    CHEST:  Normal.    LUNGS:  Clear to auscultation.    HEART:  S1, S2.    ABDOMEN:  Soft, nontender.    EXTREMITIES:  There is no lower extremity edema.    SKIN:  No skin rashes.    NEUROLOGIC:  Patient is alert and oriented x3.      LABORATORY DATA:  His recent labs from today were reviewed.  His creatinine   was 5.3.  His hemoglobin is 10.6.      ASSESSMENT AND PLAN:    1.  End-stage renal disease.   2.  Anemia.    3.  Multiple  myeloma.    4.  Abdominal pain.      PLAN:    1.  We will plan on urgent dialysis to manage his uremia.    2.  Renal dose all medications.    3.  Avoid nephrotoxin.    4.  Low sodium diet.      Plan discussed in detail with Dr. Dean.       ____________________________________     FADI NAJJAR, MD FN / DERICK    DD:  06/24/2017 13:53:59  DT:  06/24/2017 18:56:04    D#:  [id number]  Job#:  [id number]

## 2017-06-25 NOTE — PROGRESS NOTES
HEMODIALYSIS NOTES:     HD today x 3 hours per Dr. Najjar. Initiated at 1628 and ended at 1928. Patient stable, alert and oriented x 4. Denies pain or SOB. Patient tolerated treatment. Please see paper flowsheet for details.    UF Net: +1,500 mL     Blood returned. Applied gauze and held L AVF site for 15 minutes. Verified no bleeding. Bruit and thrill present post dialysis. Instructions given to Primary RN that if bleeding occurs on the AVF site, change dressing and held the site with pressure. Report given to RADHA Srinivasan RN.

## 2017-06-25 NOTE — DISCHARGE SUMMARY
Hospital Medicine Discharge Note     Patient ID:  Alfredo Thomas  [id number]  56 y.o.male  1960    Admit Date:  6/24/2017       Discharge Date:   6/25/2017    Primary Care Provider: Pcp Not In Computer    Admitting Physician: Yobani Narvaez M.D.  Discharging Physician: Jony Burks MD    Chief Complaint: abdominal pain    Discharge Diagnoses:     C-Diff diarrhea    Abdominal pain- resolved    Pancytopenia (CMS-HCC)    Macrocytic anemia    Uremia- resolved    Colonic mass    Multiple myeloma    Chronic Medical Problems:  Multiple myeloma  ESRD on HD    Code Status: Full Code    Hospital Summary:       Please refer to H&P by Dr Narvaez on 6/24/2017 for full details.      In brief, Alfredo Thomas is a 56 y.o. male who was admitted 6/24/2017 for abdominal pain for 2 days. He is visiting from [place]. He is a hemodialysis patient, on a schedule of HD T, TH, SAT. He was placed under observation status.      The patient was observed overnight. He had one loose stool that was tested positive for c-diff. He was given bowel rest with a clear liquid diet, and given IV fluid for hydration. He had improvement of his symptoms, with control of his abdominal pain and advancement of his diet. He has been without diarrhea overnight. He is tolerating a full liquid diet, and able to hydrate himself. He was given emergent hemodialysis for uremia. He is now improved, and upon examination his abdomen is soft and non-tender. He is without N/V/D.     Upon CT scan the patient does have bony metastasis to the spine, ribs and pelvis, which he states are known. He does also have an infiltrative mass of the colon which is suspicious for malignancy. I have discussed this with the patient, who wishes for workup by his oncologist rather than GI consultation here since [place]. He has been given a CD with his radiology images to take to his appointment. He will follow up with his oncologist, and will need a GI consult for  colonoscopy and biopsy of the mass.     Therefore, he is discharged in good and stable condition with close outpatient follow-up.    Consultants:      Dr Najjar- nephrology    Studies:    Imaging/ Testing:      US-GALLBLADDER   Final Result         1. No gallstone. No sonographic evidence of acute cholecystitis.   2. Atrophic right kidney.      DX-CHEST-PORTABLE (1 VIEW)   Final Result         1. No acute cardiopulmonary abnormalities are identified.        CT ABD PELVIS:  1.  Multiple lytic and sclerotic foci within the lower thoracic spine, ribs, lumbar vertebral bodies, and pelvis.    2.  These are highly suspicious for metastatic malignancy. Given the sclerotic nature prostate carcinoma is possible although given the colonic mass identified colonic malignancy could be the etiology.    3.  Ill-defined infiltrative mass of the cecum and descending colon with a length of 7.6 cm. This finding is most consistent with colonic carcinoma.    4.  Abnormal right lower quadrant mesenteric lymph node which are likely metastatic      Laboratory:   Recent Labs      06/24/17 0933  06/25/17   0450   WBC  4.8  4.7*   RBC  2.75*  2.52*   HEMOGLOBIN  10.6*  9.7*   HEMATOCRIT  30.6*  28.3*   MCV  111.3*  112.3*   MCH  38.5*  38.5*   MCHC  34.6  34.3   RDW  66.4*  65.2*   PLATELETCT  55*  45*   MPV  11.7  11.7       Recent Labs      06/24/17   0933  06/25/17   0450   SODIUM  136  139   POTASSIUM  4.1  4.4   CHLORIDE  104  107   CO2  21  25   GLUCOSE  106*  91   BUN  28*  16   CREATININE  5.31*  3.55*   CALCIUM  8.6  7.9*       Recent Labs      06/24/17   0933  06/25/17   0450   ALTSGPT  38  23   ASTSGOT  24  17   ALKPHOSPHAT  30  27*   TBILIRUBIN  1.0  0.9   LIPASE  20   --    GLUCOSE  106*  91        Recent Labs      06/24/17   0933   BNPBTYPENAT  32             Recent Labs      06/24/17   0933   TROPONINI  0.04       Results     Procedure Component Value Units Date/Time    BLOOD CULTURE [[id number]] Collected:  06/24/17 1010     "Order Status:  Completed Specimen Information:  Blood from Peripheral Updated:  06/25/17 0719     Significant Indicator NEG      Source BLD      Site PERIPHERAL      Blood Culture --      Result:        No Growth    Note: Blood cultures are incubated for 5 days and  are monitored continuously.Positive blood cultures  are called to the RN and reported as soon as  they are identified.      Narrative:      Protective  Per Hospital Policy: Only change Specimen Src: to \"Line\" if  specified by physician order.    BLOOD CULTURE [[id number]] Collected:  06/24/17 0933    Order Status:  Completed Specimen Information:  Blood from Peripheral Updated:  06/25/17 0719     Significant Indicator NEG      Source BLD      Site PERIPHERAL      Blood Culture --      Result:        No Growth    Note: Blood cultures are incubated for 5 days and  are monitored continuously.Positive blood cultures  are called to the RN and reported as soon as  they are identified.      Narrative:      Protective  Per Hospital Policy: Only change Specimen Src: to \"Line\" if  specified by physician order.    CDIFF BY PCR WITH TOXIN [[id number]] Collected:  06/25/17 0321    Order Status:  Completed Specimen Information:  Stool from Stool Updated:  06/25/17 0330    Narrative:      Special Contact Isolation  Does this patient have risk factors for C-diff?->Yes  Has patient taken stool softeners or laxatives in the last 5  days?->No  Indication for CDiff by PCR?->Greater than/equal to 3 stools  in 24 hr & \"takes shape of container\"    URINALYSIS [[id number]]  (Abnormal) Collected:  06/24/17 1530    Order Status:  Completed Specimen Information:  Urine Updated:  06/24/17 1622     Micro Urine Req Microscopic      Color Yellow      Character Clear      Specific Gravity 1.011      Ph 8.0      Glucose Negative mg/dL      Ketones Negative mg/dL      Protein 100 (A) mg/dL      Bilirubin Negative      Nitrite Negative      Leukocyte Esterase Negative      Occult Blood Small " (A)     Narrative:      Protective  Indication for culture:->Emergency Room Patient    URINE CULTURE(NEW) [[id number]] Collected:  06/24/17 1530    Order Status:  Completed Specimen Information:  Urine Updated:  06/24/17 7001    Narrative:      Protective  Indication for culture:->Emergency Room Patient          Procedures/Surgeries:        None    Disposition:  Discharge home    Condition:  Stable    Instructions:   Activity: As tolerated.  Diet: renal  Followup:   -PCP 1 week  -Oncology, call Monday for appointment to follow up on CT scan results  -GI referral by PCP or oncology for colonoscopy  -Your nephrologist Tuesday, for HD  Instructions:  -Given instructions to return to the ER if any new or worsening symptoms, worsening condition, or failure to improve  -Call PCP for followup  -No smoking, no alcohol, no caffeine  -Encourage risk factor reduction with tobacco and alcohol abstinence, diet changes, weight loss, and exercise.     Discharge Medications:           Medication List      START taking these medications       Instructions    acetaminophen 325 MG Tabs   Last time this was given:  650 mg on 6/24/2017 10:49 PM   Commonly known as:  TYLENOL    Take 2 Tabs by mouth every 6 hours as needed (Mild Pain; (Pain scale 1-3); Temp greater than 100.5 F).   Dose:  650 mg       tramadol 50 MG Tabs   Commonly known as:  ULTRAM    Take 1 Tab by mouth every 6 hours as needed for Moderate Pain.   Dose:  50 mg       vancomycin 50 mg/mL 50 mg/mL Soln   Last time this was given:  125 mg on 6/25/2017 11:41 AM    Take 2.5 mL by mouth every 6 hours for 10 days.   Dose:  125 mg         CONTINUE taking these medications       Instructions    acyclovir 200 MG Caps   Last time this was given:  200 mg on 6/25/2017 10:01 AM   Commonly known as:  ZOVIRAX    Take 200 mg by mouth 2 times a day.   Dose:  200 mg       aspirin EC 81 MG Tbec   Last time this was given:  81 mg on 6/25/2017 10:01 AM   Commonly known as:  ECOTRIN    Take 81  mg by mouth every day.   Dose:  81 mg       B-Complex w/ C & Folic Acid Tabs    Take 1 Tab by mouth every evening.   Dose:  1 Tab       Calcium-Vitamin D3 600-500 MG-UNIT Caps    Take 1 Cap by mouth 2 Times a Day.   Dose:  1 Cap       dexamethasone 4 MG Tabs   Commonly known as:  DECADRON    Take 40 mg by mouth See Admin Instructions. Takes on days 1, 8, 15 and 22 with Revlimid cycle   Dose:  40 mg       fentanyl 25 MCG/HR Pt72   Commonly known as:  DURAGESIC    Apply 1 Patch to skin as directed every 72 hours.   Dose:  1 Patch       lorazepam 0.5 MG Tabs   Last time this was given:  0.25 mg on 6/24/2017  3:50 PM   Commonly known as:  ATIVAN    Take 0.25 mg by mouth every 6 hours as needed for Anxiety (nausea).   Dose:  0.25 mg       pantoprazole 40 MG Tbec   Commonly known as:  PROTONIX    Take 40 mg by mouth 2 times a day.   Dose:  40 mg       prednisONE 2.5 MG Tabs   Last time this was given:  7.5 mg on 6/25/2017 10:02 AM   Commonly known as:  DELTASONE    Take 7.5 mg by mouth every day.   Dose:  7.5 mg       REVLIMID 5 MG Caps   Generic drug:  Lenalidomide    Take 5 mg by mouth See Admin Instructions. Takes for 21 days every 28 days.   Dose:  5 mg             RX eprescribed to [id number] - [place], [place] - [address] AT [facility]    Please CC the above physicians    LAVERN Somers  6/25/2017  9:26 AM

## 2017-06-25 NOTE — PROGRESS NOTES
Hospital Medicine Progress Note, Adult, Complex               Author: Fadi Najjar Date & Time created: 6/25/2017  1:41 PM     Interval History:  Pt with ESRD, MM.  Presented with abd pain, diagnosed with C Diff colitis  Had HD yesterday    Review of Systems:  Review of Systems   Constitutional: Negative for fever and chills.   Cardiovascular: Negative for chest pain.   Gastrointestinal: Positive for abdominal pain and diarrhea. Negative for nausea and vomiting.   Genitourinary: Negative for dysuria, urgency and frequency.       Physical Exam:  Physical Exam   Constitutional: He is oriented to person, place, and time. He appears well-developed and well-nourished. No distress.   HENT:   Head: Normocephalic and atraumatic.   Nose: Nose normal.   Cardiovascular: Normal rate and regular rhythm.    No murmur heard.  Pulmonary/Chest: Effort normal. No respiratory distress. He has no wheezes.   Musculoskeletal: He exhibits no edema.   Neurological: He is alert and oriented to person, place, and time.   Skin: He is not diaphoretic.   Nursing note and vitals reviewed.      Labs:        Invalid input(s): [id number]  Recent Labs      06/24/17 0933   TROPONINI  0.04   BNPBTYPENAT  32     Recent Labs      06/24/17   0933  06/25/17   0450   SODIUM  136  139   POTASSIUM  4.1  4.4   CHLORIDE  104  107   CO2  21  25   BUN  28*  16   CREATININE  5.31*  3.55*   CALCIUM  8.6  7.9*     Recent Labs      06/24/17 0933  06/25/17   0450   ALTSGPT  38  23   ASTSGOT  24  17   ALKPHOSPHAT  30  27*   TBILIRUBIN  1.0  0.9   LIPASE  20   --    GLUCOSE  106*  91     Recent Labs      06/24/17 0933  06/25/17   0450   RBC  2.75*  2.52*   HEMOGLOBIN  10.6*  9.7*   HEMATOCRIT  30.6*  28.3*   PLATELETCT  55*  45*   PROTHROMBTM  13.3   --    APTT  33.9   --    INR  0.98   --      Recent Labs      06/24/17 0933  06/25/17   0450   WBC  4.8  4.7*   NEUTSPOLYS  63.10   --    LYMPHOCYTES  14.00*   --    MONOCYTES  16.70*   --    EOSINOPHILS  5.30    --    BASOPHILS  0.90   --    ASTSGOT  24  17   ALTSGPT  38  23   ALKPHOSPHAT  30  27*   TBILIRUBIN  1.0  0.9           Hemodynamics:  Temp (24hrs), Av °C (98.6 °F), Min:36.7 °C (98.1 °F), Max:37.5 °C (99.5 °F)  Temperature: 36.9 °C (98.5 °F)  Pulse  Av.6  Min: 56  Max: 80   Blood Pressure: 122/61 mmHg     Respiratory:    Respiration: 18, Pulse Oximetry: 97 %           Fluids:    Intake/Output Summary (Last 24 hours) at 17 1341  Last data filed at 17 1955   Gross per 24 hour   Intake   1500 ml   Output      0 ml   Net   1500 ml        GI/Nutrition:  Orders Placed This Encounter   Procedures   • Diet Order     Standing Status: Standing      Number of Occurrences: 1      Standing Expiration Date:      Order Specific Question:  Diet:     Answer:  Full Liquid [11]     Medical Decision Making, by Problem:  Active Hospital Problems    Diagnosis   • *Abdominal pain [R10.9]   • Pancytopenia (CMS-HCC) [D61.818]   • Macrocytic anemia [D53.9]   • Uremia [N19]       Labs reviewed                  1 ESRD: HD TTS  2 C diff colitis  3 Anemia  4 MM  Plan  no acute need for HD today  Check Iron panel  Epo with HD  Renal dose all meds  Avoid nephrotoxins

## 2017-06-25 NOTE — DISCHARGE INSTRUCTIONS
Discharge Instructions    Discharged to home by car with friend. Discharged via wheelchair, hospital escort: Yes.  Special equipment needed: Not Applicable    Be sure to schedule a follow-up appointment with your primary care doctor or any specialists as instructed.     Discharge Plan:   Diet Plan: Discussed  Activity Level: Discussed  Confirmed Follow up Appointment: Patient to Call and Schedule Appointment  Confirmed Symptoms Management: Discussed  Medication Reconciliation Updated: Yes  Influenza Vaccine Indication: Not indicated: Previously immunized this influenza season and > 8 years of age    I understand that a diet low in cholesterol, fat, and sodium is recommended for good health. Unless I have been given specific instructions below for another diet, I accept this instruction as my diet prescription.   Other diet:     Special Instructions:   RX eprescribed to [id number] - [place], [place] - [address] AT [facility]   Activity: As tolerated.   Diet: renal   Followup:   -PCP 1 week   -Oncology, call Monday for appointment to follow up on CT scan results   -GI referral by PCP or oncology for colonoscopy   -Your nephrologist Tuesday, for HD   Instructions:   -Given instructions to return to the ER if any new or worsening symptoms, worsening condition, or failure to improve   -Call PCP for followup   -No smoking, no alcohol, no caffeine   -Encourage risk factor reduction with tobacco and alcohol abstinence, diet changes,     · Is patient discharged on Warfarin / Coumadin?   No     · Is patient Post Blood Transfusion?  No    Clostridium Difficile Infection  Clostridium difficile (C. difficile or C. diff) is a bacterium normally found in the intestinal tract or colon. C. difficile infection causes diarrhea and sometimes a severe disease called pseudomembranous colitis (C. difficile colitis). C. difficile colitis can damage the lining of the colon or cause the colon to become very large  (toxic megacolon). Older adults and people with certain medical conditions have a greater risk of getting C. difficile infections.  CAUSES  The balance of bacteria in your colon can change when you are sick, especially when taking antibiotic medicine. Taking antibiotics may allow the C. difficile to grow, multiply, and make a toxin that causes C. difficile infection.   SYMPTOMS  · Diarrhea.  · Fever.  · Fatigue.  · Loss of appetite.  · Nausea.  · Abdominal swelling, pain, or tenderness.  · Dehydration.  DIAGNOSIS  Your health care provider may suspect C. difficile infection based on your symptoms and if you have taken antibiotics recently. Your health care provider may also order:  · A lab test that can detect the toxin in your stool.  · A sigmoidoscopy or colonoscopy to look at the appearance of your colon. These procedures involve passing an instrument through your rectum to look at the inside of your colon.  Your health care provider will help determine if these tests are necessary.  TREATMENT  Treatment may include:  · Taking antibiotics that keep C. difficile from growing.  · Stopping the antibiotics you were on before the C. difficile infection began. Only do this if instructed to do so by your health care provider.  · IV fluids and correction of electrolyte imbalance.  · Surgery to remove the infected part of the intestines. This is rare.  HOME CARE INSTRUCTIONS  · Drink enough fluids to keep your urine clear or pale yellow. Avoid milk, caffeine, and alcohol.  · Ask your health care provider for specific rehydration instructions.  · Eat small, frequent meals rather than large meals.  · Take your antibiotics as directed. Finish them even if you start to feel better.  · Do not use medicines to slow diarrhea. This could delay healing or cause problems.  · Wash your hands thoroughly after using the bathroom and before preparing food. Make sure people who live with you wash their hands often, too.  · Clean all  surfaces with a product that contains chlorine bleach.  SEEK MEDICAL CARE IF:  · Your diarrhea lasts longer than expected or comes back after you finish your antibiotic medicine for the C. difficile infection.  · You have trouble staying hydrated.  · You have a fever.  SEEK IMMEDIATE MEDICAL CARE IF:  · You have increasing abdominal pain or tenderness.  · You have blood in your stools, or your stools look dark black and tarry.  · You cannot eat or drink without vomiting.     This information is not intended to replace advice given to you by your health care provider. Make sure you discuss any questions you have with your health care provider.     Document Released: 09/27/2006 Document Revised: 01/08/2016 Document Reviewed: 05/25/2012  [employer] Patient Education ©2016 [employer]

## 2017-06-25 NOTE — PROGRESS NOTES
Tried to waste morphine 2 mg  With Charge kye Sullivan,scanned the med,returned med drawer open instead,[facility] called and informed,siad will come up and fix it.

## 2017-06-25 NOTE — PROGRESS NOTES
"Pt  Still irritable,informed about meds being administered including heparin sub cutaneous for dvt prophylaxis,per pt never received the meds  Heparin inj and lorazepam tablet that was documented during dayshift,explained RN was going thru by what was documented,pt said, \"my insurance,i better write every thing because i don't want to get billed for meds that i was not given\".  "

## 2017-06-25 NOTE — PROGRESS NOTES
Contrast for CT given to patients with instructions on how to administer to self. Verbalizes understanding.

## 2017-06-25 NOTE — PROGRESS NOTES
Radha from Lab called with critical result of paltelet at 0530. Critical lab result read back to Radha.   Bree WHITE  notified of critical lab result at 0542.  Critical lab result read back by Bree PEREZ.

## 2017-06-25 NOTE — PROGRESS NOTES
Jesika morrissey D/C'd.  Discharge instructions provided to pt.  Pt states understanding.  Pt states all questions have been answered.  Copy of discharge provided to pt.  Signed copy in chart.  Prescriptions provided to pt, copy in chart. Pt states that all personal belongings are in possession. Pt escorted off unit without incident.

## 2017-06-25 NOTE — PROGRESS NOTES
Pt c/o HA,requesting for tylenol,says always gets a HA after dialysis,tylenol and calcium tab administered,witnessed by YISEL Sullivan in Charge.

## 2017-06-25 NOTE — PROGRESS NOTES
[facility] called again to follow-up on the waste,spoke to another [employer] tech,now told to have another RN witness waste and do MIDAS..

## 2017-06-25 NOTE — PROGRESS NOTES
Pt had watery loose stool,pt to go to dialysis,transport here,informed transport that we are initiating r/o c-diff.

## 2017-06-25 NOTE — PROGRESS NOTES
A/o,irritable and angry ,assessment completed per CDU,poc discussed,verbalized understanding,denies n/v,still c/o abd'l pain,ambulatory,call button within reach,isolation protocol in place,needs attended,will continue to monitor.

## 2017-06-25 NOTE — PROGRESS NOTES
[facility] called and informed pt having watery stool,special contact isolation initiated,stool to be send.

## 2017-06-25 NOTE — PROGRESS NOTES
Received report from evening RN. Alert and oriented X4. Respirations are even and unlabored. Denies dizziness or chest pain. Plan of care reviewed. Verbalizes understanding, monitors applied. Vital signs stable. Will continue to monitor, call light within reach.

## 2017-06-26 LAB
BACTERIA UR CULT: NORMAL
SIGNIFICANT IND 70042: NORMAL
SITE SITE: NORMAL
SOURCE SOURCE: NORMAL

## 2017-06-27 LAB — EKG IMPRESSION: NORMAL

## 2017-06-29 LAB
BACTERIA BLD CULT: NORMAL
BACTERIA BLD CULT: NORMAL
SIGNIFICANT IND 70042: NORMAL
SIGNIFICANT IND 70042: NORMAL
SITE SITE: NORMAL
SITE SITE: NORMAL
SOURCE SOURCE: NORMAL
SOURCE SOURCE: NORMAL